# Patient Record
Sex: MALE | Race: WHITE | NOT HISPANIC OR LATINO | URBAN - METROPOLITAN AREA
[De-identification: names, ages, dates, MRNs, and addresses within clinical notes are randomized per-mention and may not be internally consistent; named-entity substitution may affect disease eponyms.]

---

## 2020-09-23 ENCOUNTER — OFFICE VISIT (OUTPATIENT)
Dept: URBAN - METROPOLITAN AREA CLINIC 74 | Facility: CLINIC | Age: 70
End: 2020-09-23
Payer: MEDICARE

## 2020-09-23 DIAGNOSIS — K21.9 GASTROESOPHAGEAL REFLUX DISEASE, ESOPHAGITIS PRESENCE NOT SPECIFIED: ICD-10-CM

## 2020-09-23 DIAGNOSIS — E66.9 OBESITY (BMI 30-39.9): ICD-10-CM

## 2020-09-23 DIAGNOSIS — K51.00 ULCERATIVE COLITIS, UNIVERSAL, WITHOUT COMPLICATIONS: ICD-10-CM

## 2020-09-23 DIAGNOSIS — Z79.01 CHRONIC ANTICOAGULATION: ICD-10-CM

## 2020-09-23 DIAGNOSIS — Z86.73 HISTORY OF CARDIOEMBOLIC CEREBROVASCULAR ACCIDENT (CVA): ICD-10-CM

## 2020-09-23 PROCEDURE — 99213 OFFICE O/P EST LOW 20 MIN: CPT | Performed by: INTERNAL MEDICINE

## 2020-09-23 PROCEDURE — G8427 DOCREV CUR MEDS BY ELIG CLIN: HCPCS | Performed by: INTERNAL MEDICINE

## 2020-09-23 PROCEDURE — 3017F COLORECTAL CA SCREEN DOC REV: CPT | Performed by: INTERNAL MEDICINE

## 2020-09-23 PROCEDURE — G8419 CALC BMI OUT NRM PARAM NOF/U: HCPCS | Performed by: INTERNAL MEDICINE

## 2020-09-23 PROCEDURE — 1036F TOBACCO NON-USER: CPT | Performed by: INTERNAL MEDICINE

## 2020-09-23 RX ORDER — MESALAMINE 1.2 G/1
3 TABLETS TABLET, DELAYED RELEASE ORAL ONCE A DAY
Qty: 270 TABLET | Refills: 3
Start: 2018-03-12 | End: 2019-03-06

## 2020-09-23 NOTE — HPI-TODAY'S VISIT:
Today September 23, 2020 the patient returns for a follow-up visit, the patient was last seen in the office February 6, 2020 with a history of ulcerative colitis, obesity, history of CVA, chronic anticoagulation. During the February 6, 2020 visit the patient appeared to be doing well, patient had ran out of Lialda and was taking over-the-counter Imodium for diarrhea, she was defecating twice a day.  The patient denied any hematochezia, abdominal pain.  The patient was provided with samples of Apriso to take 1500 mg daily.  The patient was advised to have a surveillance colonoscopy and clearance was requested from cardiology so we could hold anticoagulation prior to the procedure. There is no evidence that the colonoscopy was performed.  The patient's last colonoscopy was performed January 30, 2015 and it showed diverticulosis, internal hemorrhoids normal ileal and colonic mucosa. The patient states that he was unable to get any Lialda he run out of Apriso, the patient is currently having 2 loose bowel movements per day for which she takes over-the-counter Imodium.  We will try to get into the Lialda assistance program and start the patient on 3600 mg of Lialda a day. The patient will return for a follow-up visit in 8 weeks, at the time we will schedule a surveillance colonoscopy.

## 2020-10-14 ENCOUNTER — TELEPHONE ENCOUNTER (OUTPATIENT)
Dept: URBAN - METROPOLITAN AREA CLINIC 40 | Facility: CLINIC | Age: 70
End: 2020-10-14

## 2021-12-14 ENCOUNTER — TELEPHONE ENCOUNTER (OUTPATIENT)
Dept: URBAN - METROPOLITAN AREA CLINIC 74 | Facility: CLINIC | Age: 71
End: 2021-12-14

## 2021-12-16 ENCOUNTER — DASHBOARD ENCOUNTERS (OUTPATIENT)
Age: 71
End: 2021-12-16

## 2021-12-16 ENCOUNTER — LAB OUTSIDE AN ENCOUNTER (OUTPATIENT)
Dept: URBAN - METROPOLITAN AREA CLINIC 74 | Facility: CLINIC | Age: 71
End: 2021-12-16

## 2021-12-16 ENCOUNTER — OFFICE VISIT (OUTPATIENT)
Dept: URBAN - METROPOLITAN AREA CLINIC 74 | Facility: CLINIC | Age: 71
End: 2021-12-16
Payer: MEDICARE

## 2021-12-16 DIAGNOSIS — I69.959 HEMIPLEGIA AS LATE EFFECT OF CEREBROVASCULAR DISEASE, UNSPECIFIED CEREBROVASCULAR DISEASE TYPE, UNSPECIFIED HEMIPLEGIA TYPE, UNSPECIFIED LATERALITY: ICD-10-CM

## 2021-12-16 DIAGNOSIS — E66.9 OBESITY (BMI 30-39.9): ICD-10-CM

## 2021-12-16 DIAGNOSIS — Z79.01 CHRONIC ANTICOAGULATION: ICD-10-CM

## 2021-12-16 DIAGNOSIS — K51.00 ULCERATIVE COLITIS, UNIVERSAL, WITHOUT COMPLICATIONS: ICD-10-CM

## 2021-12-16 DIAGNOSIS — K21.9 GASTROESOPHAGEAL REFLUX DISEASE, ESOPHAGITIS PRESENCE NOT SPECIFIED: ICD-10-CM

## 2021-12-16 DIAGNOSIS — Z86.73 HISTORY OF CARDIOEMBOLIC CEREBROVASCULAR ACCIDENT (CVA): ICD-10-CM

## 2021-12-16 PROCEDURE — 99213 OFFICE O/P EST LOW 20 MIN: CPT | Performed by: INTERNAL MEDICINE

## 2021-12-16 RX ORDER — MESALAMINE 1.2 G/1
2 TABLETS TABLET, DELAYED RELEASE ORAL ONCE A DAY
Status: ACTIVE | COMMUNITY

## 2021-12-16 RX ORDER — MESALAMINE 1.2 G/1
2 TABLETS TABLET, DELAYED RELEASE ORAL ONCE A DAY
Qty: 180 | Refills: 3

## 2021-12-16 RX ORDER — POLYETHYLENE GLYOCOL 3350, SODIUM CHLORIDE, SODIUM BICARBONATE AND POTASSIUM CHLORIDE 420; 11.2; 5.72; 1.48 G/4L; G/4L; G/4L; G/4L
AS DIRECTED POWDER, FOR SOLUTION NASOGASTRIC; ORAL
Qty: 1 | Refills: 0 | OUTPATIENT
Start: 2021-12-16 | End: 2021-12-17

## 2021-12-16 NOTE — HPI-TODAY'S VISIT:
Today September 23, 2020 the patient returns for a follow-up visit, the patient was last seen in the office February 6, 2020 with a history of ulcerative colitis, obesity, history of CVA, chronic anticoagulation. During the February 6, 2020 visit the patient appeared to be doing well, patient had ran out of Lialda and was taking over-the-counter Imodium for diarrhea, she was defecating twice a day.  The patient denied any hematochezia, abdominal pain.  The patient was provided with samples of Apriso to take 1500 mg daily.  The patient was advised to have a surveillance colonoscopy and clearance was requested from cardiology so we could hold anticoagulation prior to the procedure. There is no evidence that the colonoscopy was performed.  The patient's last colonoscopy was performed January 30, 2015 and it showed diverticulosis, internal hemorrhoids normal ileal and colonic mucosa. The patient states that he was unable to get any Lialda he run out of Apriso, the patient is currently having 2 loose bowel movements per day for which she takes over-the-counter Imodium.  We will try to get into the Lialda assistance program and start the patient on 3600 mg of Lialda a day. The patient will return for a follow-up visit in 8 weeks, at the time we will schedule a surveillance colonoscopy.  Today December 16, 2021 the patient returns for a follow-up visit, the patient was last seen in the office on September 23, 2020 with ulcerative colitis, gastroesophageal reflux, obesity, history of CVA on chronic anticoagulation.  At the time of the last visit the patient appeared to be doing well until he ran out of Lialda and was taking over-the-counter Imodium for diarrhea, the patient was defecating twice daily, denied having hematochezia, abdominal pain.  The patient was provided with samples of Apriso to take 1500 mg daily and was to have a surveillance colonoscopy after clearance from cardiology to hold anticoagulation before the procedure.  At the time of the September 2020 visit there was no evidence that the patient had the colonoscopy performed.  The previous one had been performed in January 2015 and showed diverticulosis, internal hemorrhoids, normal ileal and colonic mucosa.  At that time we tried to get assistance so he could obtain his Lialda and start taking 3600 mg a day, the patient was then advised to return in 6 to 8 weeks for a follow-up visit so we could schedule a colonoscopy for surveillance purposes.  There is no evidence that the patient had a surveillance procedure.  Today the patient returns to the office stating that he is doing well, denies having any diarrhea, constipation, rectal bleeding or hematochezia, weight loss.  There has been no abdominal pain.  Patient currently takes Lialda 2400 mg daily.  The patient's last colonoscopy was performed over 5 years ago.  The patient denied having any upper GI symptoms.  The patient will be scheduled to get a CBC, CMP, CRP, the patient will remain on the [place] 2400 mg daily.  The patient is to get hematology clearance to hold warfarin prior to performing a surveillance colonoscopy.  Benefits potential complications and alternatives to colonoscopy were disclosed including the possibility of a CVA once coming off anticoagulation.  The patient will return for a follow-up visit after completion of testing.

## 2021-12-17 LAB
A/G RATIO: 1.7
ALBUMIN: 4.5
ALKALINE PHOSPHATASE: 34
ALT (SGPT): 17
AST (SGOT): 20
BASO (ABSOLUTE): 0.1
BASOS: 1
BILIRUBIN, TOTAL: 0.5
BUN/CREATININE RATIO: 18
BUN: 19
C-REACTIVE PROTEIN, QUANT: 6
CALCIUM: 9.5
CARBON DIOXIDE, TOTAL: 21
CHLORIDE: 106
CREATININE: 1.06
EGFR IF AFRICN AM: 81
EGFR IF NONAFRICN AM: 70
EOS (ABSOLUTE): 0.3
EOS: 3
GLOBULIN, TOTAL: 2.7
GLUCOSE: 105
HEMATOCRIT: 45.2
HEMATOLOGY COMMENTS:: (no result)
HEMOGLOBIN: 15.4
IMMATURE CELLS: (no result)
IMMATURE GRANS (ABS): 0
IMMATURE GRANULOCYTES: 0
LYMPHS (ABSOLUTE): 2.4
LYMPHS: 28
MCH: 30.6
MCHC: 34.1
MCV: 90
MONOCYTES(ABSOLUTE): 0.6
MONOCYTES: 7
NEUTROPHILS (ABSOLUTE): 5.3
NEUTROPHILS: 61
NRBC: (no result)
PLATELETS: 291
POTASSIUM: 4.4
PROTEIN, TOTAL: 7.2
RBC: 5.04
RDW: 12.4
SODIUM: 143
WBC: 8.6

## 2021-12-27 ENCOUNTER — TELEPHONE ENCOUNTER (OUTPATIENT)
Dept: URBAN - METROPOLITAN AREA CLINIC 74 | Facility: CLINIC | Age: 71
End: 2021-12-27

## 2022-02-01 ENCOUNTER — TELEPHONE ENCOUNTER (OUTPATIENT)
Dept: URBAN - METROPOLITAN AREA CLINIC 74 | Facility: CLINIC | Age: 72
End: 2022-02-01

## 2022-04-13 NOTE — PHYSICAL EXAM CONSTITUTIONAL:
Obese, well developed, well nourished , in no acute distress , ambulating without difficulty , normal communication ability, left hemiplegia operating room

## 2023-05-23 ENCOUNTER — TELEPHONE ENCOUNTER (OUTPATIENT)
Dept: URBAN - METROPOLITAN AREA CLINIC 74 | Facility: CLINIC | Age: 73
End: 2023-05-23

## 2023-05-23 RX ORDER — MESALAMINE 1.2 G/1
2 TABLETS TABLET, DELAYED RELEASE ORAL ONCE A DAY
Qty: 180 | Refills: 3
End: 2024-05-17